# Patient Record
(demographics unavailable — no encounter records)

---

## 2024-11-14 NOTE — HISTORY OF PRESENT ILLNESS
[FreeTextEntry1] : Patient returns today c/o thyroid nodule. States that she is here today for checkup for her thyroid nodules  Last U/S 6/7/2022. Had throat obstruction sensation about 4 months ago. GI prescribed Pantoprazole with improvement. Not trouble swallowing or breathing. Has noticed any change in size of her neck.

## 2024-11-14 NOTE — PHYSICAL EXAM
[Normal] : mucosa is normal [Midline] : trachea located in midline position [de-identified] : cerumen impaction of the right EAC removed with curette.

## 2024-11-14 NOTE — DATA REVIEWED
[de-identified] : 6/7/2022 US Head and neck Impression: Bilateral thyroid nodules are stabl. None confrom to the TIRAD criteria for biopsy.  Banner Transposition Flap Text: The defect edges were debeveled with a #15 scalpel blade.  Given the location of the defect and the proximity to free margins a Banner transposition flap was deemed most appropriate.  Using a sterile surgical marker, an appropriate flap drawn around the defect. The area thus outlined was incised deep to adipose tissue with a #15 scalpel blade.  The skin margins were undermined to an appropriate distance in all directions utilizing iris scissors.

## 2024-11-14 NOTE — ASSESSMENT
[FreeTextEntry1] : I discussed the pathophysiology of acid reflux and its effect on the laryngo-pharynx. I explained the need to first control the diet as much as possible with having early dinners, avoiding certain types of food in the evening including coffee, tomato sauce, chocolate, garlic... I also explained the need for medication when needed. I also discussed the effect and safety profile of the medication.  Wax found and cleaned. Cleaning well tolerated by patient. Patient felt improvement in cloginess after cleaning.

## 2024-11-14 NOTE — PROCEDURE
[Globus] : globus [Flexible Endoscope] : examined with the flexible endoscope [Arytenoid Erythema ___ /4] : arytenoid erythema [unfilled]U/4

## 2024-12-27 NOTE — PHYSICAL EXAM
[Normocephalic] : normocephalic [Atraumatic] : atraumatic [No Supraclavicular Adenopathy] : no supraclavicular adenopathy [Symmetrical] : symmetrical [No dominant masses] : no dominant masses in right breast  [No dominant masses] : no dominant masses left breast [No Nipple Discharge] : no left nipple discharge [No Rashes] : no rashes [No Ulceration] : no ulceration [de-identified] : B/L nonspecific nodularity. [de-identified] : well healed surgical scar.  [de-identified] : No axillary lymphadenopathy appreciated. [de-identified] : No axillary lymphadenopathy appreciated.

## 2024-12-27 NOTE — DATA REVIEWED
[FreeTextEntry1] : B/L Screening Mammo & Sono - 12/16/2024: MAMMOGRAM:    Tomosynthesis 3D and 2D imaging of the breast(s) were performed.  Current study was also evaluated with a computer aided detection (CAD) system.   Breast density: The breast(s) is/are heterogeneously dense, which may obscure small masses.   No suspicious masses, calcifications, or other findings are seen.     US BREAST COMPLETE BILATERAL   Ultrasound evaluation was performed including examination of all four quadrants of the breast(s) and the retroareolar region(s).   No suspicious abnormalities were seen sonographically. There is a stable nodule in the right breast at 10:00 axis at 1 cm measuring up to 8 mm. There is also a stable previously biopsied lesion in the right breast at 9:00 axis at 3 cm from nipple measuring up to 6 mm.     Mammo BI-RADS 2: BENIGN   Ultrasound BI-RADS 2: BENIGN IMPRESSION:    No imaging evidence of malignancy on the current exam(s).   A 1 year screening mammogram of both breast(s) is recommended.   The patient will be sent a normal letter.   BI-RADS 2: BENIGN

## 2024-12-27 NOTE — PAST MEDICAL HISTORY
[Postmenopausal] : The patient is postmenopausal [Menarche Age ____] : age at menarche was [unfilled] [Menopause Age____] : age at menopause was [unfilled] [Total Preg ___] : G[unfilled] [History of Hormone Replacement Treatment] : has no history of hormone replacement treatment [FreeTextEntry5] : D&C. [FreeTextEntry6] : none [FreeTextEntry7] : none [FreeTextEntry8] : none

## 2024-12-27 NOTE — HISTORY OF PRESENT ILLNESS
[FreeTextEntry1] : Patient with Right breast cyst 12:00 subareolar region status post FNA; aspirated to complete resolution. Right breast cyst 8:00 N3, 6 mm; status post FNA 1/17/13; aspirated to complete resolution.  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 Right breast fibroadenoma on ultrasound guided core biopsy 1/7/08; 9:00 N3.  Left breast atypical lobular hyperplasia on ultrasound guided core biopsy 11/27/11; 2-3:00 retroareolar.  Status post Left NLOC 2011.  Right breast fibroadenomatous change on ultrasound guided core biopsy 3/10/16; Retroareolar 9:00.   Her family history is significant for her mother with breast cancer at age  67.     Her Taina Model risk assessment is: 6.2 % in five years  39.5 % in her lifetime.   SHANNAN QUISPE is a 65-year-old female patient who presents today in follow up for history of left ALH; high risk screening. Since her last visit, she has no new breast related complaints.   Most recent imaging: B/L Screening Mammo & Sono - 12/16/2024: -Breast density: The breast(s) is/are heterogeneously dense, which may obscure small masses. -No suspicious masses, calcifications, or other findings are seen. US BREAST COMPLETE BILATERAL -No suspicious abnormalities were seen sonographically.  -There is a stable nodule in the right breast at 10:00 axis at 1 cm measuring up to 8 mm.  -There is also a stable previously biopsied lesion in the right breast at 9:00 axis at 3 cm from nipple measuring up to 6 mm. Mammo BI-RADS 2: BENIGN Ultrasound BI-RADS 2: BENIGN IMPRESSION: No imaging evidence of malignancy on the current exam(s). BI-RADS 2: BENIGN  She presents today for evaluation and imaging review.

## 2024-12-27 NOTE — ASSESSMENT
[FreeTextEntry1] : SHANNAN is a derrick 65-year-old patient who presented today in follow up for a history of left ALH;  high risk screening.   She has been doing well with no new breast related complaints.   Most recent imaging: B/L Screening Mammo & Sono - 12/16/2024: -Breast density: The breast(s) is/are heterogeneously dense, which may obscure small masses. -No suspicious masses, calcifications, or other findings are seen. US BREAST COMPLETE BILATERAL -No suspicious abnormalities were seen sonographically.  -There is a stable nodule in the right breast at 10:00 axis at 1 cm measuring up to 8 mm.  -There is also a stable previously biopsied lesion in the right breast at 9:00 axis at 3 cm from nipple measuring up to 6 mm. Mammo BI-RADS 2: BENIGN Ultrasound BI-RADS 2: BENIGN IMPRESSION: No imaging evidence of malignancy on the current exam(s). BI-RADS 2: BENIGN, as detailed above.   Physical exam was unrevealing today.  Imaging with a B/L Breast MRI for high-risk screening will be due in June 2025, and that will be ordered today. She will return for follow-up and clinical breast exam following imaging.  I spent a total of 20 minutes of face-to-face time with this patient, greater than 50% of which was spent in counseling and/or coordination of care. All of her questions were appropriately answered. She knows to call with any concerns.   PLAN: - B/L Breast MRI - June 2025. - f/u after.

## 2024-12-27 NOTE — REASON FOR VISIT
[Follow-Up: _____] : a [unfilled] follow-up visit [FreeTextEntry1] : h/o left ALH; high risk screening; imaging review.

## 2025-01-08 NOTE — PHYSICAL EXAM
[General Appearance - Alert] : alert [General Appearance - In No Acute Distress] : in no acute distress [Oriented To Time, Place, And Person] : oriented to person, place, and time [Impaired Insight] : insight and judgment were intact [Person] : oriented to person [Place] : oriented to place [Time] : oriented to time [Fluency] : fluency intact [Comprehension] : comprehension intact [Cranial Nerves Optic (II)] : visual acuity intact bilaterally,  visual fields full to confrontation, pupils equal round and reactive to light [Cranial Nerves Oculomotor (III)] : extraocular motion intact [Cranial Nerves Trigeminal (V)] : facial sensation intact symmetrically [Cranial Nerves Facial (VII)] : face symmetrical [Cranial Nerves Vestibulocochlear (VIII)] : hearing was intact bilaterally [Cranial Nerves Glossopharyngeal (IX)] : tongue and palate midline [Cranial Nerves Accessory (XI - Cranial And Spinal)] : head turning and shoulder shrug symmetric [Cranial Nerves Hypoglossal (XII)] : there was no tongue deviation with protrusion [Motor Tone] : muscle tone was normal in all four extremities [Motor Strength] : muscle strength was normal in all four extremities [Involuntary Movements] : no involuntary movements were seen [No Muscle Atrophy] : normal bulk in all four extremities [Sensation Tactile Decrease] : light touch was intact [Sensation Pain / Temperature Decrease] : pain and temperature was intact [Sensation Vibration Decrease] : vibration was intact [Balance] : balance was intact [2+] : Ankle jerk left 2+ [PERRL With Normal Accommodation] : pupils were equal in size, round, reactive to light, with normal accommodation [Extraocular Movements] : extraocular movements were intact [Hearing Threshold Finger Rub Not [place]] : hearing was normal [Neck Appearance] : the appearance of the neck was normal [] : no respiratory distress [Heart Rate And Rhythm] : heart rate was normal and rhythm regular [Abdomen Soft] : soft [Abdomen Tenderness] : non-tender [Abnormal Walk] : normal gait [Skin Turgor] : normal skin turgor

## 2025-01-08 NOTE — PHYSICAL EXAM
[General Appearance - Alert] : alert [General Appearance - In No Acute Distress] : in no acute distress [Oriented To Time, Place, And Person] : oriented to person, place, and time [Impaired Insight] : insight and judgment were intact [Person] : oriented to person [Place] : oriented to place [Time] : oriented to time [Fluency] : fluency intact [Comprehension] : comprehension intact [Cranial Nerves Optic (II)] : visual acuity intact bilaterally,  visual fields full to confrontation, pupils equal round and reactive to light [Cranial Nerves Oculomotor (III)] : extraocular motion intact [Cranial Nerves Trigeminal (V)] : facial sensation intact symmetrically [Cranial Nerves Facial (VII)] : face symmetrical [Cranial Nerves Vestibulocochlear (VIII)] : hearing was intact bilaterally [Cranial Nerves Glossopharyngeal (IX)] : tongue and palate midline [Cranial Nerves Accessory (XI - Cranial And Spinal)] : head turning and shoulder shrug symmetric [Cranial Nerves Hypoglossal (XII)] : there was no tongue deviation with protrusion [Motor Tone] : muscle tone was normal in all four extremities [Motor Strength] : muscle strength was normal in all four extremities [Involuntary Movements] : no involuntary movements were seen [No Muscle Atrophy] : normal bulk in all four extremities [Sensation Tactile Decrease] : light touch was intact [Sensation Pain / Temperature Decrease] : pain and temperature was intact [Sensation Vibration Decrease] : vibration was intact [Balance] : balance was intact [2+] : Ankle jerk left 2+ [PERRL With Normal Accommodation] : pupils were equal in size, round, reactive to light, with normal accommodation [Extraocular Movements] : extraocular movements were intact [Hearing Threshold Finger Rub Not [place]] : hearing was normal [Neck Appearance] : the appearance of the neck was normal [] : no respiratory distress [Heart Rate And Rhythm] : heart rate was normal and rhythm regular [Abdomen Soft] : soft [Abdomen Tenderness] : non-tender [Abnormal Walk] : normal gait [Skin Turgor] : normal skin turgor

## 2025-01-10 NOTE — HISTORY OF PRESENT ILLNESS
[FreeTextEntry1] : 66 yo F w/ PMHx of breast cyst, cervical and lumbar radiculopathy, asthma presents for follow up. MRI in June 2024: L4-5 right foraminal right greater than left small extrusion with prominent enhancement of the adjacent foraminal soft tissues including right L4 nerve root. Patient now has some pain right index finger, right elbow and right shin, usually lasts for seconds and goes away. Patient has been doing stretching which helps relieve the pain. Patient also states she still has back pain sometimes and sciatica pain, leg tingling to the right leg especially at night. Feels tingling when resting. Patient is not doing PT currently; states she just learns to tolerate it. Patient does not take any pain meds.

## 2025-01-10 NOTE — ASSESSMENT
[FreeTextEntry1] : 66 yo F w/ PMHx of breast cyst, cervical and lumbar radiculopathy, asthma presents for follow up. The right elbow pain is likely related to arm position. Pain in the back and right leg is due to lumbar radiculopathy.  - Discussed about treatment options, patient prefers natural supplements rather than prescribed medicine - Educated on stretching and exercise

## 2025-01-10 NOTE — REVIEW OF SYSTEMS
[Tingling] : tingling [Limb Pain] : limb pain [Fever] : no fever [Chills] : no chills [Confused or Disoriented] : no confusion [Memory Lapses or Loss] : no memory loss [Decr. Concentrating Ability] : no decrease in concentrating ability [Facial Weakness] : no facial weakness [Arm Weakness] : no arm weakness [Hand Weakness] : no hand weakness [Leg Weakness] : no leg weakness [Seizures] : no convulsions [Dizziness] : no dizziness [Difficulty Walking] : no difficulty walking [Inability to Walk] : able to walk [Ataxia] : no ataxia [Eye Pain] : no eye pain [Loss Of Hearing] : no hearing loss [Chest Pain] : no chest pain [Shortness Of Breath] : no shortness of breath [Abdominal Pain] : no abdominal pain [Muscle Weakness] : no muscle weakness [Easy Bleeding] : no tendency for easy bleeding

## 2025-01-10 NOTE — HISTORY OF PRESENT ILLNESS
[FreeTextEntry1] : 64 yo F w/ PMHx of breast cyst, cervical and lumbar radiculopathy, asthma presents for follow up. MRI in June 2024: L4-5 right foraminal right greater than left small extrusion with prominent enhancement of the adjacent foraminal soft tissues including right L4 nerve root. Patient now has some pain right index finger, right elbow and right shin, usually lasts for seconds and goes away. Patient has been doing stretching which helps relieve the pain. Patient also states she still has back pain sometimes and sciatica pain, leg tingling to the right leg especially at night. Feels tingling when resting. Patient is not doing PT currently; states she just learns to tolerate it. Patient does not take any pain meds.

## 2025-03-14 NOTE — REASON FOR VISIT
[Subsequent Evaluation] : a subsequent evaluation for [FreeTextEntry2] : acid reflux, thyroid nodule, cerumen impaction

## 2025-03-14 NOTE — DATA REVIEWED
[de-identified] : US THyroid 1/28/25 Normal size thyroid gland. Normal homogenous echotexture. Of the thyroid nodules present, none meet criteria for FNA or routine sonographic F/U, based on TI RADS guidelines.

## 2025-03-14 NOTE — HISTORY OF PRESENT ILLNESS
[FreeTextEntry1] : Patient presents for follow up for acid reflux, thyroid nodule, cerumen impaction. Here for US Thyroid 1/28/25. Continues to have burning symptoms when laying flat at night. Taking OTC famotidine twice daily.

## 2025-06-26 NOTE — PHYSICAL EXAM
[Normocephalic] : normocephalic [EOMI] : extra ocular movement intact [No Supraclavicular Adenopathy] : no supraclavicular adenopathy [No Cervical Adenopathy] : no cervical adenopathy [Examined in the supine and seated position] : examined in the supine and seated position [No dominant masses] : no dominant masses in right breast  [No dominant masses] : no dominant masses left breast [No Nipple Retraction] : no left nipple retraction [No Nipple Discharge] : no left nipple discharge [No Axillary Lymphadenopathy] : no left axillary lymphadenopathy [No Rashes] : no rashes [No Ulceration] : no ulceration [de-identified] : NL respirations

## 2025-06-26 NOTE — DATA REVIEWED
[FreeTextEntry1] : 	Jun 18, 2025	   No suspicious enhancement noted in either breast.   Return to annual screening schedule of both breast(s) is recommended.    BI-RADS 1: NEGATIVE     MRI BREAST WITH AND WITHOUT IV CONTRAST BILATERAL   Clinical indication: Screening exam.   Family History:  Mother    Comparison: 5/17/2024 and 2/7/2023   Technique: Using a dedicated breast coil on a 1.5 Michell magnet with the patient in the prone position, both breasts were imaged in the axial plane with T1 and fluid sensitivity weighted both with and without fat suppression before and after intravenous administration. The exam included computer aided detection (CAD) with reconstruction in the sagittal and coronal planes. 3-D MIP image and pharmacokinetic analysis, with further physician review on dedicated breast MRI software was performed.     5.7 ml of GADOBUTROL 7.5 MMOL/7.5 ML (1 MMOL/ML) INTRAVENOUS SOLUTION was administered (the balance of single use vial(s) has/have been discarded).     Amount of fibroglandular tissue: Heterogeneous fibroglandular tissue.   Background parenchymal enhancement: Mild symmetric.   Findings:   RIGHT: No suspicious enhancing masses or areas of ductal enhancement are seen in the right breast.     LEFT: No suspicious enhancing masses or areas of ductal enhancement are seen in the left breast.     AXILLA and ANCILLARY FINDINGS: There is no axillary or internal mammary adenopathy.

## 2025-06-26 NOTE — HISTORY OF PRESENT ILLNESS
[FreeTextEntry1] : Patient is a 66F with hx of left breast atypical lobular hyperplasia on ultrasound guided core biopsy 11/27/11; 2-3:00 retroareolar.  Status post Left NLOC 2011. For high risk screening.  Her prior history is as follows: Right breast cyst 12:00 subareolar region status post FNA; aspirated to complete resolution. Right breast cyst 8:00 N3, 6 mm; status post FNA 1/17/13; aspirated to complete resolution.  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 Right breast fibroadenoma on ultrasound guided core biopsy 1/7/08; 9:00 N3. Right breast fibroadenomatous change on ultrasound guided core biopsy 3/10/16; Retroareolar 9:00.   Her family history is significant for her mother with breast cancer at age  67.     Her Taina Model risk assessment is: 6.2 % in five years  39.5 % in her lifetime.    Most recent imaging: B/L Screening Mammo & Sono - 12/16/2024: -Breast density: The breast(s) is/are heterogeneously dense, which may obscure small masses. -No suspicious masses, calcifications, or other findings are seen. US BREAST COMPLETE BILATERAL -No suspicious abnormalities were seen sonographically.  -There is a stable nodule in the right breast at 10:00 axis at 1 cm measuring up to 8 mm.  -There is also a stable previously biopsied lesion in the right breast at 9:00 axis at 3 cm from nipple measuring up to 6 mm. Mammo BI-RADS 2: BENIGN Ultrasound BI-RADS 2: BENIGN IMPRESSION: No imaging evidence of malignancy on the current exam(s). BI-RADS 2: BENIGN  6/18/25 b/l MRI -->birads1 - Heterogeneous fibroglandular tissue. No suspicious enhancement noted in either breast.  Denies any current complaints. No acute changes to her breasts.

## 2025-06-26 NOTE — ASSESSMENT
[FreeTextEntry1] : Patient is a 66F with hx of left breast atypical lobular hyperplasia on ultrasound guided core biopsy 11/27/11; 2-3:00 retroareolar.  Status post Left NLOC 2011. For high risk screening.  Her prior history is as follows: Right breast cyst 12:00 subareolar region status post FNA; aspirated to complete resolution. Right breast cyst 8:00 N3, 6 mm; status post FNA 1/17/13; aspirated to complete resolution.  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 Right breast fibroadenoma on ultrasound guided core biopsy 1/7/08; 9:00 N3. Right breast fibroadenomatous change on ultrasound guided core biopsy 3/10/16; Retroareolar 9:00.   Her family history is significant for her mother with breast cancer at age  67.     Her Taina Model risk assessment is: 6.2 % in five years  39.5 % in her lifetime.    Most recent imaging: B/L Screening Mammo & Sono - 12/16/2024: -Breast density: The breast(s) is/are heterogeneously dense, which may obscure small masses. -No suspicious masses, calcifications, or other findings are seen. US BREAST COMPLETE BILATERAL -No suspicious abnormalities were seen sonographically.  -There is a stable nodule in the right breast at 10:00 axis at 1 cm measuring up to 8 mm.  -There is also a stable previously biopsied lesion in the right breast at 9:00 axis at 3 cm from nipple measuring up to 6 mm. Mammo BI-RADS 2: BENIGN Ultrasound BI-RADS 2: BENIGN IMPRESSION: No imaging evidence of malignancy on the current exam(s). BI-RADS 2: BENIGN  6/18/25 b/l MRI -->birads1 - Heterogeneous fibroglandular tissue. No suspicious enhancement noted in either breast.  We discussed her most recent imaging in detail. We discussed it was benign.  We discussed breast density. Increasing breast density has been found to increase ones risk of breast cancer, but at this time, there is no clear indication for additional imaging in this setting, as both US and MRI have not been found to improve survival. One can consider bilateral screening US. However, out of 1000 women screened, the use of routine US will only identify an additional 3-5 cancers. The use of US was found to increase the likelihood of undergoing more imaging and more biopsies. She does have dense breasts. We have decided to proceed with screening bilateral breast US in the future with her screening mammograms.  All questions and concerns were answered in detail.  Patient is for bilateral mmg/us in 12/2025. She is for follow up after imaging, pending any interval changes.  Total time spent on encounter was greater than 30 minutes , which included face to face time with the patient, performing an exam, reviewing previous medical records, reviewing current imaging/ pathology, documenting in patient record and coordinating care/imaging. Greater than 50% of the encounter was spent on counseling and coordination of her breast issue.

## 2025-06-26 NOTE — PHYSICAL EXAM
[Normocephalic] : normocephalic [EOMI] : extra ocular movement intact [No Supraclavicular Adenopathy] : no supraclavicular adenopathy [No Cervical Adenopathy] : no cervical adenopathy [Examined in the supine and seated position] : examined in the supine and seated position [No dominant masses] : no dominant masses in right breast  [No dominant masses] : no dominant masses left breast [No Nipple Retraction] : no left nipple retraction [No Nipple Discharge] : no left nipple discharge [No Axillary Lymphadenopathy] : no left axillary lymphadenopathy [No Rashes] : no rashes [No Ulceration] : no ulceration [de-identified] : NL respirations

## 2025-07-16 NOTE — DISCUSSION/SUMMARY
[FreeTextEntry1] : Pap every 2 years Self breast exam stressed Follow-up with breast specialist as scheduled Follow-up yearly or as needed

## 2025-07-16 NOTE — HISTORY OF PRESENT ILLNESS
[FreeTextEntry1] : Patient is 66 years old para 0-0-0-0 last menstrual period 2012 She denies pain or postmenopausal bleeding and is presently without complaints She has a family history of breast cancer diagnosed in her mother at age 67

## 2025-07-16 NOTE — PHYSICAL EXAM
[MA] : MA [FreeTextEntry2] : SH [Appropriately responsive] : appropriately responsive [Alert] : alert [No Acute Distress] : no acute distress [No Lymphadenopathy] : no lymphadenopathy [Regular Rate Rhythm] : regular rate rhythm [No Murmurs] : no murmurs [Clear to Auscultation B/L] : clear to auscultation bilaterally [Soft] : soft [Non-tender] : non-tender [Non-distended] : non-distended [No HSM] : No HSM [No Lesions] : no lesions [No Mass] : no mass [Oriented x3] : oriented x3 [Examination Of The Breasts] : a normal appearance [No Masses] : no breast masses were palpable [Labia Majora] : normal [Labia Minora] : normal [Normal] : normal [Uterine Adnexae] : normal